# Patient Record
Sex: FEMALE | Race: WHITE | NOT HISPANIC OR LATINO | Employment: OTHER | URBAN - METROPOLITAN AREA
[De-identification: names, ages, dates, MRNs, and addresses within clinical notes are randomized per-mention and may not be internally consistent; named-entity substitution may affect disease eponyms.]

---

## 2021-06-02 DIAGNOSIS — I10 ESSENTIAL HYPERTENSION: Primary | ICD-10-CM

## 2021-06-03 ENCOUNTER — OFFICE VISIT (OUTPATIENT)
Dept: CARDIOLOGY | Facility: CLINIC | Age: 71
End: 2021-06-03

## 2021-06-03 ENCOUNTER — LAB (OUTPATIENT)
Dept: LAB | Facility: HOSPITAL | Age: 71
End: 2021-06-03

## 2021-06-03 ENCOUNTER — HOSPITAL ENCOUNTER (OUTPATIENT)
Dept: GENERAL RADIOLOGY | Facility: HOSPITAL | Age: 71
Discharge: HOME | End: 2021-06-03

## 2021-06-03 VITALS
WEIGHT: 160 LBS | OXYGEN SATURATION: 98 % | HEART RATE: 90 BPM | BODY MASS INDEX: 26.66 KG/M2 | SYSTOLIC BLOOD PRESSURE: 154 MMHG | DIASTOLIC BLOOD PRESSURE: 89 MMHG | HEIGHT: 65 IN

## 2021-06-03 DIAGNOSIS — R07.9 CHEST PAIN, UNSPECIFIED TYPE: ICD-10-CM

## 2021-06-03 DIAGNOSIS — E11.9 TYPE 2 DIABETES MELLITUS WITHOUT COMPLICATION, WITHOUT LONG-TERM CURRENT USE OF INSULIN (HCC): ICD-10-CM

## 2021-06-03 DIAGNOSIS — R07.9 CHEST PAIN, UNSPECIFIED TYPE: Primary | ICD-10-CM

## 2021-06-03 DIAGNOSIS — I10 ESSENTIAL HYPERTENSION: ICD-10-CM

## 2021-06-03 DIAGNOSIS — E78.5 HYPERLIPIDEMIA LDL GOAL <100: ICD-10-CM

## 2021-06-03 PROBLEM — I83.90 VARICOSE VEINS OF LOWER EXTREMITY: Status: ACTIVE | Noted: 2021-05-06

## 2021-06-03 PROBLEM — M79.89 SWELLING OF BOTH LOWER EXTREMITIES: Status: ACTIVE | Noted: 2021-05-06

## 2021-06-03 LAB
CHOLEST SERPL-MCNC: 126 MG/DL (ref 0–200)
HDLC SERPL-MCNC: 45 MG/DL (ref 40–60)
LDLC SERPL CALC-MCNC: 55 MG/DL (ref 0–100)
LDLC/HDLC SERPL: 1.12 {RATIO}
TRIGL SERPL-MCNC: 152 MG/DL (ref 0–150)
VLDLC SERPL-MCNC: 26 MG/DL (ref 5–40)

## 2021-06-03 PROCEDURE — 99204 OFFICE O/P NEW MOD 45 MIN: CPT | Performed by: INTERNAL MEDICINE

## 2021-06-03 PROCEDURE — 93000 ELECTROCARDIOGRAM COMPLETE: CPT | Performed by: PHYSICIAN ASSISTANT

## 2021-06-03 PROCEDURE — 36415 COLL VENOUS BLD VENIPUNCTURE: CPT

## 2021-06-03 PROCEDURE — 71046 X-RAY EXAM CHEST 2 VIEWS: CPT

## 2021-06-03 PROCEDURE — 80061 LIPID PANEL: CPT

## 2021-06-03 RX ORDER — OMEPRAZOLE 20 MG/1
CAPSULE, DELAYED RELEASE ORAL
COMMUNITY
End: 2021-06-03

## 2021-06-03 RX ORDER — CYCLOBENZAPRINE HCL 5 MG
TABLET ORAL
COMMUNITY
End: 2021-06-03

## 2021-06-03 RX ORDER — SULFAMETHOXAZOLE AND TRIMETHOPRIM 800; 160 MG/1; MG/1
TABLET ORAL
COMMUNITY
End: 2021-06-03

## 2021-06-03 RX ORDER — BRINZOLAMIDE/BRIMONIDINE TARTRATE 10; 2 MG/ML; MG/ML
SUSPENSION/ DROPS OPHTHALMIC 2 TIMES DAILY
COMMUNITY

## 2021-06-03 RX ORDER — VALSARTAN 80 MG/1
80 TABLET ORAL DAILY
COMMUNITY

## 2021-06-03 RX ORDER — CHOLECALCIFEROL (VITAMIN D3) 1250 MCG
CAPSULE ORAL
COMMUNITY
End: 2021-06-03

## 2021-06-03 RX ORDER — IBUPROFEN 600 MG/1
TABLET ORAL
COMMUNITY
End: 2021-06-03

## 2021-06-03 RX ORDER — VALACYCLOVIR HYDROCHLORIDE 1 G/1
TABLET, FILM COATED ORAL
COMMUNITY
End: 2021-06-03

## 2021-06-03 RX ORDER — BRIMONIDINE TARTRATE 2 MG/ML
SOLUTION/ DROPS OPHTHALMIC
COMMUNITY
End: 2021-06-03

## 2021-06-03 RX ORDER — BETAMETHASONE DIPROPIONATE 0.5 MG/G
OINTMENT TOPICAL
COMMUNITY
End: 2021-06-03

## 2021-06-03 RX ORDER — DIPHENOXYLATE HYDROCHLORIDE AND ATROPINE SULFATE 2.5; .025 MG/1; MG/1
1 TABLET ORAL DAILY
COMMUNITY

## 2021-06-03 RX ORDER — MAGNESIUM GLUCONATE 27 MG(500)
27 TABLET ORAL DAILY
COMMUNITY

## 2021-06-03 RX ORDER — ATORVASTATIN CALCIUM 10 MG/1
10 TABLET, FILM COATED ORAL DAILY
COMMUNITY

## 2021-06-03 RX ORDER — NITROFURANTOIN 25; 75 MG/1; MG/1
CAPSULE ORAL
COMMUNITY
End: 2021-06-03

## 2021-06-03 RX ORDER — LATANOPROST 50 UG/ML
SOLUTION/ DROPS OPHTHALMIC
COMMUNITY
Start: 2021-03-20

## 2021-06-03 RX ORDER — ONDANSETRON 4 MG/1
TABLET, ORALLY DISINTEGRATING ORAL
COMMUNITY
End: 2021-06-03

## 2021-06-03 RX ORDER — ASPIRIN 81 MG/1
TABLET, CHEWABLE ORAL
COMMUNITY

## 2021-06-03 RX ORDER — CLOTRIMAZOLE 1 %
CREAM (GRAM) TOPICAL
COMMUNITY
End: 2021-06-03

## 2021-06-03 RX ORDER — FLUCONAZOLE 150 MG/1
TABLET ORAL
COMMUNITY
End: 2021-06-03

## 2021-06-03 RX ORDER — DORZOLAMIDE HCL 20 MG/ML
1 SOLUTION/ DROPS OPHTHALMIC 2 TIMES DAILY
COMMUNITY

## 2021-06-03 NOTE — PROGRESS NOTES
[facility]  INITIAL OFFICE CONSULT    Neeru Tim  : 1950  MRN:[id number]  Patient Address: [address]  [place] [address]    Date of Encounter: 2021    PCP: Katherine Cervantes, APRN  [address] [facility] [address]  Referring MD: Katherine Cervantes, SARA     IDENTIFICATION: A 71 y.o. female resident of [place], [place]     Chief Complaint   Patient presents with   • Chest Pain     Consult     PROBLEM LIST:   1. Chest pain/shortness of breath  a. Negative MPS 2008  b. Echo : normal EF with mild LAE  c. GXT Cardiolite : no ischemia, normal EF  d. Recurrent chest pain spring 2021   2. Hypertension   3. Hyperlipidemia   4. Esophageal spasm diagnosed by Dr. Blane Gonzales   5. LE edema  a. Bilateral LE venous duplex 2021: no evidence of DVT   6. DM2  7. Osteoarthritis   8. Varicose veins     ALLERGIES:   Allergies   Allergen Reactions   • Ciprofloxacin Dizziness   • Oxycodone-Acetaminophen Nausea Only       Current Outpatient Medications   Medication Instructions   • aspirin 81 MG chewable tablet aspirin 81 mg chewable tablet   Daily   • atorvastatin (LIPITOR) 10 mg, Daily   • Brinzolamide-Brimonidine (Simbrinza) 1-0.2 % suspension 2 Times Daily   • dorzolamide (TRUSOPT) 2 % ophthalmic solution 1 drop, Both Eyes, 2 Times Daily   • latanoprost (XALATAN) 0.005 % ophthalmic solution No dose, route, or frequency recorded.   • magnesium gluconate (MAGONATE) 27 mg, Oral, Daily   • metFORMIN (GLUCOPHAGE) 1,500 mg, Daily With Breakfast   • multivitamin (MULTIPLE VITAMIN PO) 1 tablet, Daily   • Probiotic Product (PROBIOTIC DAILY PO) Oral, 2 Times Daily   • triamcinolone (KENALOG) 0.1 % ointment triamcinolone acetonide 0.1 % topical ointment   • valsartan (DIOVAN) 80 mg, Daily     HPI: Mrs. Tim is a pleasant 72 y/o WF with above noted history who presents in consultation for chest pain. She notes a several month history of intermittent midsternal  "chest pressure that radiates up her neck and into her jaws at times, as well as straight through to her back and right scapular region. These are non-exertional in nature and can last up to 30 minutes. She denies associated symptoms of dyspnea, nausea/vomiting or diaphoresis. She also notes a history of esophageal spasms, and is unsure if her current symptoms are cardiac in nature or due to her esophageal spasm. Recently, her younger sister had a silent MI and had 4 stents placed, and she became concerned due to her family history. She denies tobacco,alcohol or drug use. Home BPs are usually 120-130s systolic. She is due for labs with her PCP next month.     Cardiac Risk Factors include: advanced age (older than 55 for men, 65 for women), diabetes mellitus, dyslipidemia, family history of premature cardiovascular disease, hypertension and sedentary lifestyle    ROS: All systems have been reviewed and are negative with the exception of those mentioned in the HPI and problem list above.    Past Surgical History:   Procedure Laterality Date   • APPENDECTOMY     • BREAST BIOPSY     • GALLBLADDER SURGERY     • REDUCTION MAMMAPLASTY     • TUBAL ABDOMINAL LIGATION         Social History     Socioeconomic History   • Marital status:    Tobacco Use   • Smoking status: Never Smoker   • Smokeless tobacco: Never Used   Substance and Sexual Activity   • Alcohol use: Never   • Drug use: Never   • Sexual activity: Defer       Family History   Problem Relation Age of Onset   • Pneumonia Mother    • Cancer Father    • Heart attack Sister    • Heart disease Sister        Objective     Vitals:    06/03/21 1211 06/03/21 1214 06/03/21 1215 06/03/21 1216   BP: 150/89 149/82 149/89 154/89   BP Location: Left arm Left arm Right arm Left arm   Patient Position: Sitting Standing Standing Sitting   Pulse: 88 92 97 90   SpO2: 98% 98% 98% 98%   Weight: 72.6 kg (160 lb)      Height: 163.8 cm (64.5\")        Body mass index is 27.04 " kg/m².    PHYSICAL EXAM:  CONSTITUTIONAL: Well nourished, cooperative, in no acute distress  HEENT: Normocephalic, atraumatic, PERRLA, no JVD  CARDIOVASCULAR:  Regular rhythm and normal rate, no murmur, gallop, rub.   RESPIRATORY: Clear to auscultation, normal respiratory effort, no wheezing, rales or ronchi  GI: Soft, nontender, normal bowel sounds  EXTREMITIES: No gross deformities, no edema. Peripheral pulses are present and equal bilaterally  SKIN: Warm, dry. No bleeding, bruising or rash  NEUROLOGICAL: No focal deficits  PSYCHIATRIC: Normal mood and affect. Behavior is normal     Labs/Diagnostic Data  Labs 7/2020:  CBC: WBC 6.6, RBC 4.14, Hgb 13.5, Hct 38.2, Plt 179  CMP: Glucose 111, BUN 15, Cr 0.74, eGFR 82, Na 135, K 4.3, Cl 100, CO2 24, Alk Phos 66, AST 21, ALT 22  A1C 6.2%      ECG 12 Lead    Date/Time: 6/3/2021 1:06 PM  Performed by: Prachi Scott PA-C  Authorized by: Prachi Scott PA-C   Comparison: compared with previous ECG from 12/13/2011  Similar to previous ECG  Rhythm: sinus rhythm  Rate: normal  BPM: 78  QRS axis: normal    Clinical impression: normal ECG        Radiology Data:   CXR: No acute cardiopulmonary process. Official report pending.     Assessment and Plan:     1. Chest pressure: Could be related to esophageal spasm, however she has multiple risk factors for CAD including HTN, HLD, diabetes and family history. We will obtain a GXT Cardiolite for further evaluation and will call her with results.   2. Hypertension: elevated today, however historically controlled. We will not make any changes at this time and have asked her to continue home monitoring and follow up with her PCP if they remain elevated.   3. Dyslipidemia: check lipid panel today. She is on low dose Lipitor and depending on lipid panel we may increase this. She had a recent CT of her abdomen which showed aortic atherosclerosis without dilation.   4. Final disposition pending stress test results.        Scribed for Royal Esquivel MD by Prachi Scott PA-C. 6/3/2021  13:05 EDT

## 2021-07-14 ENCOUNTER — HOSPITAL ENCOUNTER (OUTPATIENT)
Dept: CARDIOLOGY | Facility: HOSPITAL | Age: 71
Discharge: HOME | End: 2021-07-14

## 2021-07-14 DIAGNOSIS — E78.5 HYPERLIPIDEMIA LDL GOAL <100: ICD-10-CM

## 2021-07-14 DIAGNOSIS — E11.9 TYPE 2 DIABETES MELLITUS WITHOUT COMPLICATION, WITHOUT LONG-TERM CURRENT USE OF INSULIN (HCC): ICD-10-CM

## 2021-07-14 DIAGNOSIS — I10 ESSENTIAL HYPERTENSION: ICD-10-CM

## 2021-07-14 DIAGNOSIS — R07.9 CHEST PAIN, UNSPECIFIED TYPE: ICD-10-CM

## 2021-07-14 LAB
BH CV REST NUCLEAR ISOTOPE DOSE: 9.9 MCI
BH CV STRESS BP STAGE 1: NORMAL
BH CV STRESS BP STAGE 2: NORMAL
BH CV STRESS DURATION MIN STAGE 1: 3
BH CV STRESS DURATION MIN STAGE 2: 3
BH CV STRESS DURATION SEC STAGE 1: 0
BH CV STRESS DURATION SEC STAGE 2: 9
BH CV STRESS GRADE STAGE 1: 10
BH CV STRESS GRADE STAGE 2: 12
BH CV STRESS HR STAGE 1: 120
BH CV STRESS HR STAGE 2: 150
BH CV STRESS METS STAGE 1: 5
BH CV STRESS METS STAGE 2: 7.5
BH CV STRESS NUCLEAR ISOTOPE DOSE: 31.5 MCI
BH CV STRESS O2 STAGE 1: 95
BH CV STRESS O2 STAGE 2: 98
BH CV STRESS PROTOCOL 1: NORMAL
BH CV STRESS RECOVERY BP: NORMAL MMHG
BH CV STRESS RECOVERY HR: 93 BPM
BH CV STRESS RECOVERY O2: 99 %
BH CV STRESS SPEED STAGE 1: 1.7
BH CV STRESS SPEED STAGE 2: 2.5
BH CV STRESS STAGE 1: 1
BH CV STRESS STAGE 2: 2
LV EF NUC BP: 89 %
MAXIMAL PREDICTED HEART RATE: 149 BPM
PERCENT MAX PREDICTED HR: 116.11 %
STRESS BASELINE BP: NORMAL MMHG
STRESS BASELINE HR: 77 BPM
STRESS O2 SAT REST: 97 %
STRESS PERCENT HR: 137 %
STRESS POST ESTIMATED WORKLOAD: 7.4 METS
STRESS POST EXERCISE DUR MIN: 6 MIN
STRESS POST EXERCISE DUR SEC: 9 SEC
STRESS POST O2 SAT PEAK: 98 %
STRESS POST PEAK BP: NORMAL MMHG
STRESS POST PEAK HR: 173 BPM
STRESS TARGET HR: 127 BPM

## 2021-07-14 PROCEDURE — 78452 HT MUSCLE IMAGE SPECT MULT: CPT | Performed by: INTERNAL MEDICINE

## 2021-07-14 PROCEDURE — A9500 TC99M SESTAMIBI: HCPCS | Performed by: INTERNAL MEDICINE

## 2021-07-14 PROCEDURE — 93017 CV STRESS TEST TRACING ONLY: CPT

## 2021-07-14 PROCEDURE — 93018 CV STRESS TEST I&R ONLY: CPT | Performed by: INTERNAL MEDICINE

## 2021-07-14 PROCEDURE — 78452 HT MUSCLE IMAGE SPECT MULT: CPT

## 2021-07-14 PROCEDURE — 0 TECHNETIUM SESTAMIBI: Performed by: INTERNAL MEDICINE

## 2021-07-14 RX ADMIN — TECHNETIUM TC 99M SESTAMIBI 1 DOSE: 1 INJECTION INTRAVENOUS at 10:05

## 2021-07-14 RX ADMIN — TECHNETIUM TC 99M SESTAMIBI 1 DOSE: 1 INJECTION INTRAVENOUS at 08:05

## 2021-07-19 ENCOUNTER — TELEPHONE (OUTPATIENT)
Dept: CARDIOLOGY | Facility: CLINIC | Age: 71
End: 2021-07-19

## 2021-07-19 NOTE — TELEPHONE ENCOUNTER
Spoke with pt. Made aware of normal nuclear MPS and hypertensive response at rest and with exercise. She reports home blood pressure readings - 163/99, 132/81, 133/84, 125/83, 135/87, 122/82, 128/87, 119/80.    She will continue to follow home blood pressure readings and follow up with PCP closely. She understands target BP should be less than 130/80. We will see her back PRN. Letter sent to PCP and pt as well.

## 2023-04-16 ENCOUNTER — APPOINTMENT (OUTPATIENT)
Dept: GENERAL RADIOLOGY | Facility: HOSPITAL | Age: 73
End: 2023-04-16
Payer: MEDICARE

## 2023-04-16 ENCOUNTER — APPOINTMENT (OUTPATIENT)
Dept: CT IMAGING | Facility: HOSPITAL | Age: 73
End: 2023-04-16
Payer: MEDICARE

## 2023-04-16 ENCOUNTER — HOSPITAL ENCOUNTER (EMERGENCY)
Facility: HOSPITAL | Age: 73
Discharge: HOME | End: 2023-04-16
Attending: EMERGENCY MEDICINE | Admitting: EMERGENCY MEDICINE
Payer: MEDICARE

## 2023-04-16 VITALS
BODY MASS INDEX: 25.66 KG/M2 | SYSTOLIC BLOOD PRESSURE: 151 MMHG | TEMPERATURE: 97.9 F | WEIGHT: 154 LBS | OXYGEN SATURATION: 97 % | HEART RATE: 82 BPM | RESPIRATION RATE: 16 BRPM | DIASTOLIC BLOOD PRESSURE: 79 MMHG | HEIGHT: 65 IN

## 2023-04-16 DIAGNOSIS — S00.83XA CONTUSION OF FACE, INITIAL ENCOUNTER: ICD-10-CM

## 2023-04-16 DIAGNOSIS — S52.614A CLOSED NONDISPLACED FRACTURE OF STYLOID PROCESS OF RIGHT ULNA, INITIAL ENCOUNTER: Primary | ICD-10-CM

## 2023-04-16 DIAGNOSIS — S06.0X0A CONCUSSION WITHOUT LOSS OF CONSCIOUSNESS, INITIAL ENCOUNTER: ICD-10-CM

## 2023-04-16 DIAGNOSIS — S09.90XA INJURY OF HEAD, INITIAL ENCOUNTER: ICD-10-CM

## 2023-04-16 DIAGNOSIS — S60.222A CONTUSION OF LEFT HAND, INITIAL ENCOUNTER: ICD-10-CM

## 2023-04-16 PROCEDURE — 73110 X-RAY EXAM OF WRIST: CPT

## 2023-04-16 PROCEDURE — 70450 CT HEAD/BRAIN W/O DYE: CPT

## 2023-04-16 PROCEDURE — 73130 X-RAY EXAM OF HAND: CPT

## 2023-04-16 PROCEDURE — 70486 CT MAXILLOFACIAL W/O DYE: CPT

## 2023-04-16 PROCEDURE — 99282 EMERGENCY DEPT VISIT SF MDM: CPT

## 2023-04-16 NOTE — ED PROVIDER NOTES
Subjective   History of Present Illness  Pt is a 74 yo female presenting to ED with complaints of pain after a fall. PMHx significant for HTN, HLD, DM, Reflux, Glaucoma and Reflux. Pt explains yesterday tripped on uneven sidewalk falling forward. She hit her face on the ground but denies LOC. She has a mild headache today. She has some swelling, contusions and abrasions to her face. She denies dizziness, weakness, numbness, neck pain, back pain, nose bleed or vision changes. She also complains of pain, swelling and bruising to left hand and right wrist. She does not take blood thinners other than ASA.     History provided by:  Medical records and patient      Review of Systems   Constitutional: Negative for fever.   HENT: Positive for facial swelling.    Eyes: Negative for visual disturbance.   Respiratory: Negative for cough and shortness of breath.    Cardiovascular: Negative for chest pain.   Gastrointestinal: Negative for abdominal pain, nausea and vomiting.   Genitourinary: Negative for difficulty urinating.   Musculoskeletal: Positive for arthralgias. Negative for back pain and neck pain.   Skin: Positive for wound.   Neurological: Positive for headaches. Negative for dizziness, weakness and numbness.   Psychiatric/Behavioral: Negative for confusion.       Past Medical History:   Diagnosis Date   • Acid reflux    • Arthritis    • Diabetes mellitus    • Glaucoma    • History of echocardiogram    • History of stomach ulcers    • History of stress test    • Hyperlipidemia    • Hypertension        Allergies   Allergen Reactions   • Ciprofloxacin Dizziness   • Oxycodone-Acetaminophen Nausea Only       Past Surgical History:   Procedure Laterality Date   • APPENDECTOMY     • BREAST BIOPSY     • GALLBLADDER SURGERY     • REDUCTION MAMMAPLASTY     • TUBAL ABDOMINAL LIGATION         Family History   Problem Relation Age of Onset   • Pneumonia Mother    • Cancer Father    • Heart attack Sister    • Heart disease Sister         Social History     Socioeconomic History   • Marital status:    Tobacco Use   • Smoking status: Never   • Smokeless tobacco: Never   Substance and Sexual Activity   • Alcohol use: Never   • Drug use: Never   • Sexual activity: Defer           Objective   Physical Exam  Vitals and nursing note reviewed.   Constitutional:       Appearance: She is well-developed.   HENT:      Head: Atraumatic.      Nose: Nose normal.   Eyes:      General: Lids are normal.      Conjunctiva/sclera: Conjunctivae normal.      Pupils: Pupils are equal, round, and reactive to light.   Cardiovascular:      Rate and Rhythm: Normal rate and regular rhythm.      Heart sounds: Normal heart sounds.   Pulmonary:      Effort: Pulmonary effort is normal.      Breath sounds: Normal breath sounds. No wheezing.   Abdominal:      General: There is no distension.      Palpations: Abdomen is soft.      Tenderness: There is no abdominal tenderness. There is no guarding or rebound.   Musculoskeletal:         General: Normal range of motion.      Right shoulder: No tenderness. Normal range of motion.      Left shoulder: No tenderness. Normal range of motion.      Right elbow: Normal range of motion. No tenderness.      Left elbow: Normal range of motion. No tenderness.      Right wrist: Swelling and tenderness present. No deformity. Normal range of motion.      Left wrist: No swelling, deformity or tenderness. Normal range of motion.      Right hand: No swelling or tenderness. Normal range of motion. Normal capillary refill. Normal pulse.      Left hand: Swelling and tenderness present. Normal range of motion. Normal capillary refill. Normal pulse.      Cervical back: Normal range of motion and neck supple. No tenderness. Normal range of motion.      Thoracic back: No tenderness. Normal range of motion.      Lumbar back: No tenderness. Normal range of motion.   Skin:     General: Skin is warm and dry.      Findings: No erythema or rash.  "  Neurological:      Mental Status: She is alert and oriented to person, place, and time.      Sensory: No sensory deficit.   Psychiatric:         Speech: Speech normal.         Behavior: Behavior normal.         Procedures           ED Course      No results found for this or any previous visit (from the past 24 hour(s)).  Note: In addition to lab results from this visit, the labs listed above may include labs taken at another facility or during a different encounter within the last 24 hours. Please correlate lab times with ED admission and discharge times for further clarification of the services performed during this visit.    CT Head Without Contrast   Final Result   Impression:      1. No acute intracranial abnormality.      Electronically Signed: Sedrick Angel     4/16/2023 1:49 PM EDT     Workstation ID: [id number]      CT Facial Bones Without Contrast   Final Result   Impression:      1. No evidence of acute facial bone fracture.      Electronically Signed: Sedrick Angel     4/16/2023 1:52 PM EDT     Workstation ID: [id number]      XR Hand 3+ View Left   Final Result   Impression:   Mild interphalangeal joint arthritic change. No acute abnormality.      Electronically Signed: Wei Olea     4/16/2023 1:14 PM EDT     Workstation ID: [id number]      XR Wrist 3+ View Right   Final Result   Impression:   Nondisplaced fracture of the ulnar styloid.      Electronically Signed: Wei Emmie     4/16/2023 1:18 PM EDT     Workstation ID: [id number]        Vitals:    04/16/23 1224   BP: 151/79   Patient Position: Sitting   Pulse: 82   Resp: 16   Temp: 97.9 °F (36.6 °C)   TempSrc: Oral   SpO2: 97%   Weight: 69.9 kg (154 lb)   Height: 163.8 cm (64.5\")     Medications - No data to display  ECG/EMG Results (last 24 hours)     ** No results found for the last 24 hours. **        No orders to display       DISCHARGE    Patient discharged in stable condition.    Reviewed implications of results, diagnosis, meds, responsibility to " follow up, warning signs and symptoms of possible worsening, potential complications and reasons to return to ER.    Patient/Family voiced understanding of above instructions.    Discussed plan for discharge, as there is no emergent indication for admission.  Pt/family is agreeable and understands need for follow up and possible repeat testing.  Pt/family is aware that discharge does not mean that nothing is wrong but that it indicates no emergency is currently present that requires admission and they must continue care with follow-up as given below or with a physician of their choice.     FOLLOW-UP  Katherine Cervantes, APRN  [address] [facility] [id number] [phone]    Schedule an appointment as soon as possible for a visit       Froylan Gtz Jr., MD  [address]  [address]  [facility] [id number] [phone]    Schedule an appointment as soon as possible for a visit       [facility] Emergency Department  [address] [facility] [address] [phone]    If symptoms worsen         Medication List      No changes were made to your prescriptions during this visit.                                            Medical Decision Making  Pt is a 74 yo female presenting to ED with complaints of pain after a fall. CT head and facial bones without emergent findings. Left hand NAD. Right wrist with non displaced ulnar styloid fx. Patient placed in splint. Discussed results and outpatient f/u with PCP and Ortho.     Discussed patient with Dr. Decker who is agreeable with ED course and tx plan.     DDx  Fracture, Sprain, Contusion, Dislocation, SAH    Closed nondisplaced fracture of styloid process of right ulna, initial encounter: acute illness or injury  Concussion without loss of consciousness, initial encounter: acute illness or injury  Contusion of face, initial encounter: acute illness or injury  Contusion of left hand, initial encounter: acute illness or injury  Injury  of head, initial encounter: acute illness or injury  Amount and/or Complexity of Data Reviewed  External Data Reviewed: notes.  Radiology: ordered. Decision-making details documented in ED Course.          Final diagnoses:   Closed nondisplaced fracture of styloid process of right ulna, initial encounter   Contusion of left hand, initial encounter   Injury of head, initial encounter   Concussion without loss of consciousness, initial encounter   Contusion of face, initial encounter       ED Disposition  ED Disposition     ED Disposition   Discharge    Condition   Stable    Comment   --             Katherine Cervantes, APRN  [address] [place] [id number] [phone]    Schedule an appointment as soon as possible for a visit       Froylan Gtz Jr., MD  [address]  [address]  [place] [id number] [phone]    Schedule an appointment as soon as possible for a visit       [facility]  [address] [facility] [address] [phone]    If symptoms worsen         Medication List      No changes were made to your prescriptions during this visit.          Alessandra Stone PA  04/16/23 2011

## 2024-01-12 ENCOUNTER — OFFICE VISIT (OUTPATIENT)
Dept: CARDIOLOGY | Facility: CLINIC | Age: 74
End: 2024-01-12
Payer: MEDICARE

## 2024-01-12 VITALS
SYSTOLIC BLOOD PRESSURE: 140 MMHG | WEIGHT: 142.6 LBS | HEIGHT: 64 IN | DIASTOLIC BLOOD PRESSURE: 68 MMHG | HEART RATE: 57 BPM | OXYGEN SATURATION: 94 % | BODY MASS INDEX: 24.34 KG/M2

## 2024-01-12 DIAGNOSIS — R00.2 PALPITATIONS: ICD-10-CM

## 2024-01-12 DIAGNOSIS — I47.10 SUSTAINED SVT: Primary | ICD-10-CM

## 2024-01-12 DIAGNOSIS — I10 PRIMARY HYPERTENSION: ICD-10-CM

## 2024-01-12 RX ORDER — LOSARTAN POTASSIUM 100 MG/1
100 TABLET ORAL DAILY
COMMUNITY

## 2024-01-12 RX ORDER — MULTIPLE VITAMINS W/ MINERALS TAB 9MG-400MCG
1 TAB ORAL DAILY
COMMUNITY

## 2024-01-12 RX ORDER — AMMONIUM LACTATE 12 G/100G
LOTION TOPICAL AS NEEDED
COMMUNITY

## 2024-01-12 RX ORDER — FAMOTIDINE 20 MG/1
20 TABLET, FILM COATED ORAL DAILY
COMMUNITY

## 2024-01-12 RX ORDER — HYDROCHLOROTHIAZIDE 12.5 MG/1
12.5 TABLET ORAL DAILY
COMMUNITY

## 2024-01-12 RX ORDER — BRIMONIDINE TARTRATE 2 MG/ML
1 SOLUTION/ DROPS OPHTHALMIC 2 TIMES DAILY
COMMUNITY

## 2024-01-12 NOTE — PROGRESS NOTES
"[facility]  INITIAL OFFICE CONSULT      Neeru Tim  1950  PCP: Kelley Nguyen MD    SUBJECTIVE:   Neeru Tim is a 73 y.o. female seen for a consultation visit regarding the following:     Chief Complaint:   Chief Complaint   Patient presents with    superventricular tachycardia    Shortness of Breath     In the morning          Consultation is requested by JOSE ALEJANDRO Bergman* for evaluation of superventricular tachycardia and Shortness of Breath (In the morning)        History:  73 year old female presents for evaluation regarding palpitations . She has had some \"flutters\" that started a few months ago. She states these are rare and episodes are non long acting maybe for a few minutes. The flutters on average occur in the morning and last just seconds. She does not have any concomitant symptoms like chest pain, dizziness, near syncope or syncope. Her bp is controlled. She states her esophageal symptoms are minimal right now. She has worked hard to reduce her symptoms.     Cardiac PMH: (Old records have been reviewed and summarized below)  Chest pain/shortness of breath  Negative MPS 2008  Echo 2008: normal EF with mild LAE  GXT Cardiolite 2012: no ischemia, normal EF  Recurrent chest pain spring 2021   Negative Stress test 7/2021 Normal EF 70%, No Ischemia   Palpitations  HOlter 12/14/2023: AT, Sinus Rhythm   Hypertension   Hyperlipidemia   Esophageal spasm diagnosed by Dr. Blane Gonzales   LE edema  Bilateral LE venous duplex April 2021: no evidence of DVT   DM2  Osteoarthritis   Varicose veins     Past Medical History, Past Surgical History, Family history, Social History, and Medications were all reviewed with the patient today and updated as necessary.     Current Outpatient Medications   Medication Sig Dispense Refill    ammonium lactate (LAC-HYDRIN) 12 % lotion Apply  topically to the appropriate area as directed As Needed.      aspirin 81 MG " chewable tablet aspirin 81 mg chewable tablet   Daily      atorvastatin (LIPITOR) 10 MG tablet 1 tablet Daily.      Blood Glucose Calibration (ACCU-CHEK KARISHMA VI) Accu-Chek Karishma Plus test strips   TESTS BID  DX E11.9      brimonidine (ALPHAGAN) 0.2 % ophthalmic solution Administer 1 drop to both eyes 2 (Two) Times a Day.      DORZOLAMIDE HCL-TIMOLOL MAL OP Administer 1 drop to both eyes 2 (Two) Times a Day. 22.3mg/6.8mg      famotidine (PEPCID) 20 MG tablet Take 1 tablet by mouth Daily.      hydroCHLOROthiazide (HYDRODIURIL) 12.5 MG tablet Take 1 tablet by mouth Daily.      latanoprost (XALATAN) 0.005 % ophthalmic solution Administer 1 drop to both eyes Every Night.      losartan (COZAAR) 100 MG tablet Take 1 tablet by mouth Daily.      metFORMIN (GLUCOPHAGE) 500 MG tablet 3 tablets Daily With Dinner.      multivitamin with minerals (CENTRUM SILVER 50+WOMEN PO) Take 1 tablet by mouth Daily.      triamcinolone (KENALOG) 0.1 % ointment As Needed.      Probiotic Product (PROBIOTIC DAILY PO) Take  by mouth 2 (Two) Times a Day. (Patient not taking: Reported on 1/12/2024)       No current facility-administered medications for this visit.     Allergies   Allergen Reactions    Ciprofloxacin Dizziness    Oxycodone-Acetaminophen Nausea Only     percocet         Past Medical History:   Diagnosis Date    Acid reflux     Arthritis     Diabetes mellitus     Glaucoma     History of echocardiogram     History of stomach ulcers     History of stress test     Hyperlipidemia     Hypertension      Past Surgical History:   Procedure Laterality Date    APPENDECTOMY      BREAST BIOPSY      GALLBLADDER SURGERY      REDUCTION MAMMAPLASTY      TUBAL ABDOMINAL LIGATION       Family History   Problem Relation Age of Onset    Pneumonia Mother     Cancer Father     Heart attack Sister     Heart disease Sister      Social History     Tobacco Use    Smoking status: Never     Passive exposure: Past    Smokeless tobacco: Never   Substance Use  "Topics    Alcohol use: Never       ROS:  Review of Symptoms:  General: no recent weight loss/gain, weakness or fatigue  Skin: no rashes, lumps, or other skin changes  HEENT: no dizziness, lightheadedness, or vision changes  Respiratory: no cough or hemoptysis  Cardiovascular: + palpitations, and tachycardia  Gastrointestinal: no black/tarry stools or diarrhea  Urinary: no change in frequency or urgency  Peripheral Vascular: no claudication or leg cramps  Musculoskeletal: no muscle or joint pain/stiffness  Psychiatric: no depression or excessive stress  Neurological: no sensory or motor loss, no syncope  Hematologic: no anemia, easy bruising or bleeding  Endocrine: no thyroid problems, nor heat or cold intolerance         PHYSICAL EXAM:   /68 (BP Location: Right arm, Patient Position: Sitting)   Pulse 57   Ht 162.6 cm (64\")   Wt 64.7 kg (142 lb 9.6 oz)   SpO2 94%   BMI 24.48 kg/m²      Wt Readings from Last 5 Encounters:   01/12/24 64.7 kg (142 lb 9.6 oz)   04/16/23 69.9 kg (154 lb)   06/03/21 72.6 kg (160 lb)     BP Readings from Last 5 Encounters:   01/12/24 140/68   04/16/23 151/79   06/03/21 154/89       General-Well Nourished, Well developed  Eyes - PERRLA  Neck- supple, No mass  CV- regular rate and rhythm, no MRG  Lung- clear bilaterally  Abd- soft, +BS  Musc/skel - Norm strength and range of motion  Skin- warm and dry  Neuro - Alert & Oriented x 3, appropriate mood.    Patient's external notes were reviewed.  Independent interpretation of test performed by another physician in facility were reviewed.  Outside laboratory data was also reviewed.    Medical problems and test results were reviewed with the patient today.           Lab Results   Component Value Date    CHOL 126 06/03/2021    HDL 45 06/03/2021    LDL 55 06/03/2021    VLDL 26 06/03/2021       EKG:  (EKG/Tracing has been independently visualized by me and summarized below)      ECG 12 Lead    Date/Time: 1/12/2024 9:46 AM  Performed by: " Juan Alberto Dalal PA    Authorized by: Juan Alberto Dalal PA  Comparison: compared with previous ECG from 6/3/2021  Similar to previous ECG  Rhythm: sinus rhythm  Rate: normal  Conduction: conduction normal  ST Segments: ST segments normal  T Waves: T waves normal  QRS axis: normal    Clinical impression: normal ECG          ASSESSMENT   1. Chest pain: Negative Stress MPS 2021. No symptoms suggesting angina.     2. Palpitations: Holter monitor revealing SVT, atrial tachycardia. NO aifb. Occasional PACs and PVCs.     3. HTN: Controlled     4. HLD: Statin Rx.       PLAN  Echocardiogram  Reviewed monitor with patient -Episodes of AT. No Afib. We discussed option of Beta blockers, Flecanide rX. She does have Lower heart rate times.  She would like to hold off on medication for now.  If symptoms progress in nature we discussed option of flecainide she may consider this in the future.  Return follow-up in few months or sooner as needed.           Cardiology/Electrophysiology  01/12/24  09:26 EST  Electronically signed by FRANCE Phipps, 01/12/24, 9:45 AM EST.

## 2024-02-20 ENCOUNTER — HOSPITAL ENCOUNTER (OUTPATIENT)
Dept: CARDIOLOGY | Facility: HOSPITAL | Age: 74
Discharge: HOME | End: 2024-02-20
Admitting: PHYSICIAN ASSISTANT
Payer: MEDICARE

## 2024-02-20 VITALS
HEIGHT: 64 IN | BODY MASS INDEX: 24.24 KG/M2 | WEIGHT: 142 LBS | DIASTOLIC BLOOD PRESSURE: 73 MMHG | SYSTOLIC BLOOD PRESSURE: 144 MMHG

## 2024-02-20 DIAGNOSIS — I47.10 SUSTAINED SVT: ICD-10-CM

## 2024-02-20 DIAGNOSIS — R00.2 PALPITATIONS: ICD-10-CM

## 2024-02-20 LAB
BH CV ECHO MEAS - AO MAX PG: 5.1 MMHG
BH CV ECHO MEAS - AO MEAN PG: 3 MMHG
BH CV ECHO MEAS - AO ROOT DIAM: 3.7 CM
BH CV ECHO MEAS - AO V2 MAX: 113 CM/SEC
BH CV ECHO MEAS - AO V2 VTI: 26.6 CM
BH CV ECHO MEAS - AVA(I,D): 2.14 CM2
BH CV ECHO MEAS - EDV(CUBED): 35.9 ML
BH CV ECHO MEAS - EDV(MOD-SP2): 72.4 ML
BH CV ECHO MEAS - EDV(MOD-SP4): 70.7 ML
BH CV ECHO MEAS - EF(MOD-BP): 62.8 %
BH CV ECHO MEAS - EF(MOD-SP2): 62.8 %
BH CV ECHO MEAS - EF(MOD-SP4): 64.1 %
BH CV ECHO MEAS - ESV(CUBED): 10.6 ML
BH CV ECHO MEAS - ESV(MOD-SP2): 26.9 ML
BH CV ECHO MEAS - ESV(MOD-SP4): 25.4 ML
BH CV ECHO MEAS - FS: 33.3 %
BH CV ECHO MEAS - IVS/LVPW: 1 CM
BH CV ECHO MEAS - IVSD: 0.9 CM
BH CV ECHO MEAS - LA DIMENSION: 3.6 CM
BH CV ECHO MEAS - LAT PEAK E' VEL: 9.5 CM/SEC
BH CV ECHO MEAS - LV MASS(C)D: 81.1 GRAMS
BH CV ECHO MEAS - LV MAX PG: 2.32 MMHG
BH CV ECHO MEAS - LV MEAN PG: 1 MMHG
BH CV ECHO MEAS - LV V1 MAX: 76.1 CM/SEC
BH CV ECHO MEAS - LV V1 VTI: 18.1 CM
BH CV ECHO MEAS - LVIDD: 3.3 CM
BH CV ECHO MEAS - LVIDS: 2.2 CM
BH CV ECHO MEAS - LVOT AREA: 3.1 CM2
BH CV ECHO MEAS - LVOT DIAM: 2 CM
BH CV ECHO MEAS - LVPWD: 0.9 CM
BH CV ECHO MEAS - MED PEAK E' VEL: 7.1 CM/SEC
BH CV ECHO MEAS - MV A MAX VEL: 75.5 CM/SEC
BH CV ECHO MEAS - MV DEC SLOPE: 360 CM/SEC2
BH CV ECHO MEAS - MV DEC TIME: 0.24 SEC
BH CV ECHO MEAS - MV E MAX VEL: 65.5 CM/SEC
BH CV ECHO MEAS - MV E/A: 0.87
BH CV ECHO MEAS - MV MAX PG: 3.6 MMHG
BH CV ECHO MEAS - MV MEAN PG: 1 MMHG
BH CV ECHO MEAS - MV P1/2T: 54.3 MSEC
BH CV ECHO MEAS - MV V2 VTI: 24.2 CM
BH CV ECHO MEAS - MVA(P1/2T): 4.1 CM2
BH CV ECHO MEAS - MVA(VTI): 2.35 CM2
BH CV ECHO MEAS - PA ACC TIME: 0.11 SEC
BH CV ECHO MEAS - PA V2 MAX: 83.9 CM/SEC
BH CV ECHO MEAS - PI END-D VEL: 112 CM/SEC
BH CV ECHO MEAS - RAP SYSTOLE: 3 MMHG
BH CV ECHO MEAS - RVSP: 37 MMHG
BH CV ECHO MEAS - SV(LVOT): 56.9 ML
BH CV ECHO MEAS - SV(MOD-SP2): 45.5 ML
BH CV ECHO MEAS - SV(MOD-SP4): 45.3 ML
BH CV ECHO MEAS - TAPSE (>1.6): 1.87 CM
BH CV ECHO MEAS - TR MAX PG: 34.1 MMHG
BH CV ECHO MEAS - TR MAX VEL: 292 CM/SEC
BH CV ECHO MEASUREMENTS AVERAGE E/E' RATIO: 7.89
BH CV VAS BP LEFT ARM: NORMAL MMHG
BH CV XLRA - RV BASE: 3.2 CM
BH CV XLRA - RV LENGTH: 5.9 CM
BH CV XLRA - RV MID: 2.5 CM
BH CV XLRA - TDI S': 11.6 CM/SEC
LEFT ATRIUM VOLUME INDEX: 23.1 ML/M2

## 2024-02-20 PROCEDURE — 93306 TTE W/DOPPLER COMPLETE: CPT

## 2024-02-20 PROCEDURE — 93306 TTE W/DOPPLER COMPLETE: CPT | Performed by: INTERNAL MEDICINE

## 2024-04-12 ENCOUNTER — OFFICE VISIT (OUTPATIENT)
Dept: CARDIOLOGY | Facility: CLINIC | Age: 74
End: 2024-04-12
Payer: MEDICARE

## 2024-04-12 VITALS
SYSTOLIC BLOOD PRESSURE: 122 MMHG | BODY MASS INDEX: 24.16 KG/M2 | DIASTOLIC BLOOD PRESSURE: 78 MMHG | HEIGHT: 65 IN | OXYGEN SATURATION: 99 % | WEIGHT: 145 LBS | HEART RATE: 64 BPM

## 2024-04-12 DIAGNOSIS — I10 PRIMARY HYPERTENSION: Primary | ICD-10-CM

## 2024-04-12 DIAGNOSIS — I47.10 SUSTAINED SVT: ICD-10-CM

## 2024-04-12 PROCEDURE — 3078F DIAST BP <80 MM HG: CPT | Performed by: PHYSICIAN ASSISTANT

## 2024-04-12 PROCEDURE — 3074F SYST BP LT 130 MM HG: CPT | Performed by: PHYSICIAN ASSISTANT

## 2024-04-12 PROCEDURE — 99213 OFFICE O/P EST LOW 20 MIN: CPT | Performed by: PHYSICIAN ASSISTANT

## 2024-04-12 RX ORDER — METFORMIN HYDROCHLORIDE 500 MG/1
500 TABLET, EXTENDED RELEASE ORAL 3 TIMES DAILY
COMMUNITY

## 2025-04-15 ENCOUNTER — OFFICE VISIT (OUTPATIENT)
Dept: CARDIOLOGY | Facility: CLINIC | Age: 75
End: 2025-04-15
Payer: MEDICARE

## 2025-04-15 VITALS
DIASTOLIC BLOOD PRESSURE: 68 MMHG | BODY MASS INDEX: 25.46 KG/M2 | SYSTOLIC BLOOD PRESSURE: 124 MMHG | HEIGHT: 65 IN | OXYGEN SATURATION: 94 % | HEART RATE: 66 BPM | WEIGHT: 152.8 LBS

## 2025-04-15 DIAGNOSIS — I10 PRIMARY HYPERTENSION: Primary | ICD-10-CM

## 2025-04-15 DIAGNOSIS — I47.10 SUSTAINED SVT: ICD-10-CM

## 2025-04-15 PROCEDURE — 3078F DIAST BP <80 MM HG: CPT | Performed by: PHYSICIAN ASSISTANT

## 2025-04-15 PROCEDURE — 3074F SYST BP LT 130 MM HG: CPT | Performed by: PHYSICIAN ASSISTANT

## 2025-04-15 PROCEDURE — G2211 COMPLEX E/M VISIT ADD ON: HCPCS | Performed by: PHYSICIAN ASSISTANT

## 2025-04-15 PROCEDURE — 99214 OFFICE O/P EST MOD 30 MIN: CPT | Performed by: PHYSICIAN ASSISTANT

## 2025-04-15 RX ORDER — MINOXIDIL 2.5 MG/1
2.5 TABLET ORAL DAILY
COMMUNITY
Start: 2025-01-12

## 2025-04-15 NOTE — PROGRESS NOTES
[facility]   OFFICE NOTE      Neeru Tim  1950  PCP: Kelley Nguyen MD    SUBJECTIVE:   Neeru Tim is a 75 y.o. female seen for a follow up visit regarding the following:     CC:Palpitations    HPI:   Pleasant 74 year old returns for follow up of palpations, Atrial tachycardia. Since we last saw the pt, pt denies any sustained palpitation episodes, SOB, CP, LH, and dizziness. Denies any hospitalizations, ER visits, bleeding, or TIA/CVA symptoms. Overall feels well.     Cardiac PMH: (Old records have been reviewed and summarized below)  Chest pain/shortness of breath  Negative MPS 2008  Echo 2008: normal EF with mild LAE  GXT Cardiolite 2012: no ischemia, normal EF  Recurrent chest pain spring 2021   Negative Stress test 7/2021 Normal EF 70%, No Ischemia   Palpitations  HOlter 12/14/2023: AT, Sinus Rhythm   Hypertension   Hyperlipidemia   Esophageal spasm diagnosed by Dr. Blane Gonzales   LE edema  Bilateral LE venous duplex April 2021: no evidence of DVT   DM2  Osteoarthritis   Varicose veins     Past Medical History, Past Surgical History, Family history, Social History, and Medications were all reviewed with the patient today and updated as necessary.       Current Outpatient Medications:     ammonium lactate (LAC-HYDRIN) 12 % lotion, Apply  topically to the appropriate area as directed As Needed., Disp: , Rfl:     aspirin 81 MG chewable tablet, aspirin 81 mg chewable tablet  Daily, Disp: , Rfl:     atorvastatin (LIPITOR) 10 MG tablet, 1 tablet Daily., Disp: , Rfl:     Blood Glucose Calibration (ACCU-CHEK MARÍA ELENA VI), Accu-Chek María Elena Plus test strips  TESTS BID  DX E11.9, Disp: , Rfl:     brimonidine (ALPHAGAN) 0.2 % ophthalmic solution, Administer 1 drop to both eyes 2 (Two) Times a Day., Disp: , Rfl:     Cyanocobalamin 1000 MCG capsule, Daily., Disp: , Rfl:     DORZOLAMIDE HCL-TIMOLOL MAL OP, Administer 1 drop to both eyes 2 (Two) Times a Day. 22.3mg/6.8mg,  "Disp: , Rfl:     famotidine (PEPCID) 20 MG tablet, Take 1 tablet by mouth Daily., Disp: , Rfl:     hydroCHLOROthiazide (HYDRODIURIL) 12.5 MG tablet, Take 1 tablet by mouth Daily., Disp: , Rfl:     latanoprost (XALATAN) 0.005 % ophthalmic solution, Administer 1 drop to both eyes Every Night., Disp: , Rfl:     losartan (COZAAR) 100 MG tablet, Take 1 tablet by mouth Daily., Disp: , Rfl:     metFORMIN ER (GLUCOPHAGE-XR) 500 MG 24 hr tablet, Take 1 tablet by mouth 3 (Three) Times a Day. At dinner, Disp: , Rfl:     minoxidil (LONITEN) 2.5 MG tablet, Take 1 tablet by mouth Daily., Disp: , Rfl:     multivitamin with minerals (CENTRUM SILVER 50+WOMEN PO), Take 1 tablet by mouth Daily., Disp: , Rfl:     Probiotic Product (PROBIOTIC DAILY PO), Take  by mouth 2 (Two) Times a Day., Disp: , Rfl:     triamcinolone (KENALOG) 0.1 % ointment, As Needed., Disp: , Rfl:     metFORMIN (GLUCOPHAGE) 500 MG tablet, 3 tablets Daily With Dinner., Disp: , Rfl:       Allergies   Allergen Reactions    Ciprofloxacin Dizziness    Oxycodone-Acetaminophen Nausea Only     percocet         PHYSICAL EXAM:    /68 (BP Location: Right arm, Patient Position: Sitting, Cuff Size: Adult)   Pulse 66   Ht 163.8 cm (64.5\")   Wt 69.3 kg (152 lb 12.8 oz)   SpO2 94%   BMI 25.82 kg/m²        Wt Readings from Last 5 Encounters:   04/15/25 69.3 kg (152 lb 12.8 oz)   04/12/24 65.8 kg (145 lb)   02/20/24 64.4 kg (142 lb)   01/12/24 64.7 kg (142 lb 9.6 oz)   04/16/23 69.9 kg (154 lb)       BP Readings from Last 5 Encounters:   04/15/25 124/68   04/12/24 122/78   02/20/24 144/73   01/12/24 140/68   04/16/23 151/79       General appearance - Alert, well appearing, and in no distress   Mental status - Affect appropriate to mood.  Eyes - Sclerae anicteric,  ENMT - Hearing grossly normal bilaterally, Dental hygiene good.  Neck - Carotids upstroke normal bilaterally, no bruits, no JVD.  Resp - Clear to auscultation, no wheezes, rales or rhonchi, symmetric air " entry.  Heart - Normal rate, regular rhythm, normal S1, S2, no murmurs, rubs, clicks or gallops.  GI - Soft, nontender, nondistended, no masses or organomegaly.  Neurological - Grossly intact - normal speech, no focal findings  Musculoskeletal - No joint tenderness, deformity or swelling, no muscular tenderness noted.  Extremities - Peripheral pulses normal, no pedal edema, no clubbing or cyanosis.  Skin - Normal coloration and turgor.  Psych -  oriented to person, place, and time.    Medical problems and test results were reviewed with the patient today.     No results found for this or any previous visit (from the past 4 weeks).      EKG: (EKG has been independently visualized by me and summarized below)  EKG NSR.     ASSESSMENT   1. Chest Pain, Negative Stress MPS 2021, No symptoms suggesting Angina.  Echocardiogram, EF 62% Mild TR, Mild PI. RVSP 37mmHG.     2. Palpitations: SVT-AT, Minimally symptomatic.     3. HTN: Controlled on HCTZ, Losartan.     4. HLD:LDL 55,  Statin    PLAN  Return for follow up one year.         4/15/2025  Electronically signed by FRANCE Phipps, 04/15/25, 10:40 AM EDT.